# Patient Record
Sex: MALE | Race: BLACK OR AFRICAN AMERICAN | Employment: FULL TIME | URBAN - METROPOLITAN AREA
[De-identification: names, ages, dates, MRNs, and addresses within clinical notes are randomized per-mention and may not be internally consistent; named-entity substitution may affect disease eponyms.]

---

## 2017-06-28 PROBLEM — E66.01 MORBID OBESITY DUE TO EXCESS CALORIES (HCC): Status: ACTIVE | Noted: 2017-06-28

## 2017-09-21 PROBLEM — E11.3219 MILD NONPROLIFERATIVE DIABETIC RETINOPATHY WITH MACULAR EDEMA ASSOCIATED WITH TYPE 2 DIABETES MELLITUS (HCC): Status: ACTIVE | Noted: 2017-09-21

## 2019-05-16 ENCOUNTER — PATIENT OUTREACH (OUTPATIENT)
Dept: OTHER | Age: 49
End: 2019-05-16

## 2019-05-16 NOTE — PROGRESS NOTES
Briefly spoke with patient, who is still hospitalized. Verified two patient identifiers. Explained ECM services to patient. Patient does not know when he will be discharged. He was very appreciative for our services. Let him know I will call back on Monday to check on him. Ensured he had my name and number if he needs anything.

## 2019-05-20 ENCOUNTER — PATIENT OUTREACH (OUTPATIENT)
Dept: OTHER | Age: 49
End: 2019-05-20

## 2019-05-22 ENCOUNTER — PATIENT OUTREACH (OUTPATIENT)
Dept: OTHER | Age: 49
End: 2019-05-22

## 2019-05-22 NOTE — PROGRESS NOTES
Transition Of Care Note    Patient discharged from [facility] of Leeanna Davis admitted on  and discharged on  for cellulitis. Medical History:     Past Medical History:   Diagnosis Date    Cellulitis     Diabetes ([address]     Hypertension     Obesity        Care Manager contacted the patient by telephone to perform post hospital discharge assessment. Verified  and zip code with patient as identifiers. Provided introduction to self, and explanation of the Nurse Care Manager role. Patient's primary care provider relationship reviewed with patient and modified, as applicable. Red Flags:  Call your doctor now or seek immediate medical care if:    · You have signs that your infection is getting worse, such as:  ? Increased pain, swelling, warmth, or redness. ? Red streaks leading from the area. ? Pus draining from the area. ? A fever. · You get a rash. Watch closely for changes in your health, and be sure to contact your doctor if:    · You do not get better as expected. Condition Focused Assessment:   Patient reports the following:    Patient reported vital signs and important numbers to know:    Last BP Not taken at home   Pulse for 60 seconds Not taken at home   Temp Not taken   Pain 0-10 5 of 10   Location/pain characteristics Right lower leg   Last daily weight in lbs 315 lbs     In the last 24 hour have you experienced; Fever no    Low body temperature no    Chills or shaking no    Sweating no    Fast heart rate no    Fast breathing no    Dizziness/lightheadedness no    Confusion or unusual change in mental status no    Diarrhea no    Nausea no    Vomiting no    Shortness of breath or difficulty breathing no, more work to move around   Less urine output no    Cold, clammy, and pale skin no     Skin rash or skin color changes no  Skin- any open wounds or incisions?  yes  Description of wound- area looks blistered, no open wound, dried skin in some area, not covered, cracks between toes may have been point of entry  New or worsening pain? no  If yes, pain rated 0-10: n/a Location/pain characteristics: n/a   New or worsening numbness or tingling? no  If yes, location of numbness and tingling: n/a  Any IV access site for antibiotics? n/a  Recent urinary catheter? n/a  Does patient have incentive spirometer? no  If yes, how frequently is patient using incentive spirometer? n/a      Medication:   New Medications at Discharge: lantus 10 units daily  Changed Medications at Discharge: no  Discontinued Medications at Discharge: metformin, glipizide  Current Outpatient Medications   Medication Sig    furosemide (LASIX) 20 mg tablet TAKE 1 TABLET BY MOUTH EVERY DAY    metFORMIN (GLUCOPHAGE) 1,000 mg tablet TAKE 1 TABLET BY MOUTH TWICE A DAY WITH MEALS    glipiZIDE (GLUCOTROL) 10 mg tablet TAKE 1 TAB BY MOUTH TWO (2) TIMES A DAY.  lisinopril-hydroCHLOROthiazide (PRINZIDE, ZESTORETIC) 20-25 mg per tablet TAKE 1 TAB BY MOUTH DAILY.  glucose blood VI test strips (ONETOUCH ULTRA TEST) strip Use to test your blood sugar three times a day.  amLODIPine (NORVASC) 10 mg tablet Take 1 Tab by mouth daily.  fexofenadine (ALLEGRA) 180 mg tablet Take 1 Tab by mouth daily.  amoxicillin (AMOXIL) 875 mg tablet Take 1 Tab by mouth two (2) times a day.  pioglitazone (ACTOS) 15 mg tablet Take 1 Tab by mouth daily.  ibuprofen (MOTRIN) 800 mg tablet Take 1 Tab by mouth every eight (8) hours as needed for Pain.  acetaminophen-codeine (TYLENOL #3) 300-30 mg per tablet Take 2 Tabs by mouth every six (6) hours as needed for Pain. Max Daily Amount: 8 Tabs.  ergocalciferol (ERGOCALCIFEROL) 50,000 unit capsule Take 1 Cap by mouth every seven (7) days.  azithromycin (ZITHROMAX) 250 mg tablet     clindamycin (CLEOCIN) 150 mg capsule     Terbinafine (LAMISIL AT) 1 % topical gel Apply  to affected area two (2) times a day. No current facility-administered medications for this visit. There are no discontinued medications. Performed medication reconciliation with patient, and patient verbalizes understanding of administration of home medications. There were no barriers to obtaining medications identified at this time. Inpatient RRAT score: outside facility  Was this a readmission? no   Patient stated reason for the readmission: n/a    Barriers/Support system:  patient and friend and family    Home health/DME in place per discharge orders    Barriers/Challenges to Care: []  Decline in memory    []  Language barrier     []  Emotional                  []  Limited mobility  []  Lack of motivation     [] Vision, hearing or cognitive impairment []  Knowledge [] Financial Barriers []  Lack of support  []  Pain []  Other [x]  None (wants to get back to work)    CM Identified  Problems   (contributing problems for risk for readmission)  1. Knowledge Deficit related to diabetes  2. Impaired Skin Integrity related to cellulitis       Discharge Instructions :  Reviewed discharge instructions with patient. Patient verbalizes understanding of discharge instructions and follow-up care. Advance Care Planning:   Patient was offered the opportunity to discuss advance care planning:  Not needed   Does patient have an Advance Directive:  yes   If no, did you provide information on [facility]? yes     PCP/Specialist follow up: Patient scheduled to follow up with Yamile Bone MD on 5/29. Future Appointments   Date Time Provider Felicia Arndt   5/29/2019 11:00 AM Mary Tyler MD [address] [facility]      Reviewed red flags with patient, and patient verbalizes understanding. Patient given an opportunity to ask questions. No other clinical/social/functional needs noted. The patient agrees to contact the PCP office for questions related to their healthcare. The patient expressed thanks, offered no additional questions and ended the call.

## 2019-05-29 PROBLEM — N17.9 AKI (ACUTE KIDNEY INJURY) (HCC): Status: ACTIVE | Noted: 2019-05-29

## 2019-05-29 PROBLEM — E83.42 HYPOMAGNESEMIA: Status: ACTIVE | Noted: 2019-05-29

## 2019-05-29 PROBLEM — L03.90 CELLULITIS: Status: ACTIVE | Noted: 2019-05-29

## 2019-05-29 PROBLEM — L03.115 CELLULITIS OF RIGHT LEG: Status: ACTIVE | Noted: 2019-05-29

## 2019-05-31 ENCOUNTER — PATIENT OUTREACH (OUTPATIENT)
Dept: OTHER | Age: 49
End: 2019-05-31

## 2019-05-31 PROBLEM — L03.115 CELLULITIS OF RIGHT LOWER LEG: Status: ACTIVE | Noted: 2019-05-31

## 2019-05-31 NOTE — PROGRESS NOTES
Attempt to reach patient for follow up. Discreet VM left with contact information. Will follow up with him on Monday, 6/3, if I don't hear back.

## 2019-06-05 ENCOUNTER — PATIENT OUTREACH (OUTPATIENT)
Dept: OTHER | Age: 49
End: 2019-06-05

## 2019-06-05 NOTE — PROGRESS NOTES
Follow up phone call to patient, two pt identifiers verified. Discussed patient's goals:       Patient's primary care provider relationship reviewed with patient and modified, as applicable. Condition Focused Assessment   Diabetes Condition Focused Assessment    Skin- any open wounds or incisions? yes  Description of wound- bandaged up  New or worsening pain? no  If yes, pain rated 0-10: 5 to 6 of 10 Location/pain characteristics: right leg   New or worsening numbness or tingling? no  If yes, location of numbness and tingling: n/a    Patient reported important numbers to know:  Last  this AM   Last A1C 9.6   Urine protein (microalbumin) neg or pos? Not assessed during this visit   Weight 319 lbs   /96     Last lipid panel: Activity level- moving several times a day, or as recommended? yes  Abnormal activity level reported: n/a   Nutrition- prescribed diet? yes   Diet prescribed or recommended: Diabetic   Diabetic medication changes Lantus 15 units daily    Readiness to Change: []  Pre-contemplative    []  Contemplative  []  Preparation               [x]  Action                  []  Maintenance    Barriers/Challenges to Care: []  Decline in memory    []  Language barrier     []  Emotional                  []  Limited mobility  []  Lack of motivation     [] Vision, hearing or cognitive impairment []  Knowledge [] Financial Barriers []  Lack of support  [x]  Pain []  Other []  None    Key pt activities to achieve better health:   [x]  Weight loss  [x]  Improved diabetic control  []  Decreased cholesterol levels  []  Decreased blood pressure  []    []    Upcoming appointments:   Future Appointments   Date Time Provider Felicia Arntd   6/7/2019  8:45 AM Miah Tyler MD [facility]   6/7/2019 10:00 AM Kaylee MCNEILL, PT [facility]   6/12/2019  1:00 PM BAL NURSE PROVIDER [facility]     Plan for next call: Healing well? Keeping follow up? Glucose control?

## 2019-06-07 ENCOUNTER — PATIENT OUTREACH (OUTPATIENT)
Dept: OTHER | Age: 49
End: 2019-06-07

## 2019-06-07 NOTE — PROGRESS NOTES
Made a quick call to check in on patient before the weekend and my time off next week. Patient states, \"I can't believe the services that [facility] offers. It's amazing. \"  He was appreciative of my call. Stated he was doing fine. Explained that he has been for all of his follow ups and is not having any issues. Let him know that I will have the person covering for me contact information on my voicemail. He was very appreciative.

## 2019-06-13 ENCOUNTER — PATIENT OUTREACH (OUTPATIENT)
Dept: OTHER | Age: 49
End: 2019-06-13

## 2019-06-13 NOTE — PROGRESS NOTES
Attempt to reach patient for follow up. Discreet VM left with contact information. Will follow up next week. Wound care appointment was yesterday.

## 2019-06-19 PROBLEM — L97.912 ULCER OF RIGHT LEG, WITH FAT LAYER EXPOSED (HCC): Status: ACTIVE | Noted: 2019-06-19

## 2019-06-20 ENCOUNTER — PATIENT OUTREACH (OUTPATIENT)
Dept: OTHER | Age: 49
End: 2019-06-20

## 2019-07-03 ENCOUNTER — PATIENT OUTREACH (OUTPATIENT)
Dept: OTHER | Age: 49
End: 2019-07-03

## 2019-07-03 NOTE — LETTER
7/3/2019 2:47 PM 
 
Mr. [place] [id number] Dear  Roseanna Alexandru,  
 
I have been trying to reach you for a follow up call for services with our Employee Care Management Program. Your wellbeing is very important to us. With continued partnership in the [facility] program, we hope to work with you to optimize your health and increase your quality of life. I look forward to continuing to work with you. Please contact me at your convenience and we can schedule a time that works best for you. Sincerely, Nancy Alejo, RN, BSN  Employee Care Manager [address] [place] [address], [address] [place] [address] Kayla Pulliam [phone]  F [phone]  [email or web address] Indra OLSON [email or web address]

## 2019-07-10 PROBLEM — L97.912 ULCER OF RIGHT LEG, WITH FAT LAYER EXPOSED (HCC): Status: RESOLVED | Noted: 2019-06-19 | Resolved: 2019-07-10

## 2019-07-24 ENCOUNTER — DOCUMENTATION ONLY (OUTPATIENT)
Dept: OTHER | Age: 49
End: 2019-07-24

## 2019-07-24 NOTE — PROGRESS NOTES
Resolving current episode Transitions of care complete. No further ED/UC or hospital admissions within 30 days post discharge. Patient attended follow-up appointments as directed. No outreach from patient to [address] [place] [address].

## 2019-09-11 PROBLEM — E11.21 TYPE 2 DIABETES WITH NEPHROPATHY (HCC): Status: ACTIVE | Noted: 2019-09-11

## 2019-09-16 PROBLEM — N18.30 CKD (CHRONIC KIDNEY DISEASE) STAGE 3, GFR 30-59 ML/MIN (HCC): Status: ACTIVE | Noted: 2019-09-16

## 2019-10-18 PROBLEM — G11.8 EPISODIC ATAXIA WITH SLURRED SPEECH (HCC): Status: ACTIVE | Noted: 2019-10-18

## 2019-10-18 PROBLEM — H53.2 DOUBLE VISION WITH BOTH EYES OPEN: Status: ACTIVE | Noted: 2019-10-18

## 2019-10-18 PROBLEM — R47.81 EPISODIC ATAXIA WITH SLURRED SPEECH (HCC): Status: ACTIVE | Noted: 2019-10-18

## 2019-10-18 PROBLEM — G52.9 CRANIAL NERVE PALSY: Status: ACTIVE | Noted: 2019-10-18

## 2019-10-18 PROBLEM — G98.8: Status: ACTIVE | Noted: 2019-10-18

## 2019-10-18 PROBLEM — R26.9 GAIT DIFFICULTY: Status: ACTIVE | Noted: 2019-10-18
